# Patient Record
Sex: FEMALE | Race: WHITE | NOT HISPANIC OR LATINO | URBAN - METROPOLITAN AREA
[De-identification: names, ages, dates, MRNs, and addresses within clinical notes are randomized per-mention and may not be internally consistent; named-entity substitution may affect disease eponyms.]

---

## 2019-09-22 ENCOUNTER — EMERGENCY (EMERGENCY)
Facility: HOSPITAL | Age: 64
LOS: 1 days | Discharge: HOME | End: 2019-09-22
Attending: STUDENT IN AN ORGANIZED HEALTH CARE EDUCATION/TRAINING PROGRAM
Payer: MEDICARE

## 2019-09-22 VITALS
HEART RATE: 102 BPM | HEIGHT: 62 IN | DIASTOLIC BLOOD PRESSURE: 91 MMHG | SYSTOLIC BLOOD PRESSURE: 161 MMHG | OXYGEN SATURATION: 98 % | RESPIRATION RATE: 16 BRPM | WEIGHT: 171.96 LBS | TEMPERATURE: 98 F

## 2019-09-22 VITALS
HEART RATE: 78 BPM | TEMPERATURE: 99 F | RESPIRATION RATE: 16 BRPM | SYSTOLIC BLOOD PRESSURE: 162 MMHG | OXYGEN SATURATION: 98 % | DIASTOLIC BLOOD PRESSURE: 88 MMHG

## 2019-09-22 LAB
ALBUMIN SERPL ELPH-MCNC: 4.3 G/DL — SIGNIFICANT CHANGE UP (ref 3.3–5)
ALP SERPL-CCNC: 57 U/L — SIGNIFICANT CHANGE UP (ref 40–120)
ALT FLD-CCNC: 13 U/L — SIGNIFICANT CHANGE UP (ref 10–45)
ANION GAP SERPL CALC-SCNC: 14 MMOL/L — SIGNIFICANT CHANGE UP (ref 5–17)
AST SERPL-CCNC: 16 U/L — SIGNIFICANT CHANGE UP (ref 10–40)
BASOPHILS # BLD AUTO: 0 K/UL — SIGNIFICANT CHANGE UP (ref 0–0.2)
BASOPHILS NFR BLD AUTO: 0.3 % — SIGNIFICANT CHANGE UP (ref 0–2)
BILIRUB SERPL-MCNC: 0.8 MG/DL — SIGNIFICANT CHANGE UP (ref 0.2–1.2)
BUN SERPL-MCNC: 12 MG/DL — SIGNIFICANT CHANGE UP (ref 7–23)
CALCIUM SERPL-MCNC: 9.6 MG/DL — SIGNIFICANT CHANGE UP (ref 8.4–10.5)
CHLORIDE SERPL-SCNC: 104 MMOL/L — SIGNIFICANT CHANGE UP (ref 96–108)
CO2 SERPL-SCNC: 23 MMOL/L — SIGNIFICANT CHANGE UP (ref 22–31)
CREAT SERPL-MCNC: 0.74 MG/DL — SIGNIFICANT CHANGE UP (ref 0.5–1.3)
EOSINOPHIL # BLD AUTO: 0.3 K/UL — SIGNIFICANT CHANGE UP (ref 0–0.5)
EOSINOPHIL NFR BLD AUTO: 3.2 % — SIGNIFICANT CHANGE UP (ref 0–6)
GLUCOSE SERPL-MCNC: 149 MG/DL — HIGH (ref 70–99)
HCT VFR BLD CALC: 43.9 % — SIGNIFICANT CHANGE UP (ref 34.5–45)
HGB BLD-MCNC: 13.9 G/DL — SIGNIFICANT CHANGE UP (ref 11.5–15.5)
LYMPHOCYTES # BLD AUTO: 3.2 K/UL — SIGNIFICANT CHANGE UP (ref 1–3.3)
LYMPHOCYTES # BLD AUTO: 30.6 % — SIGNIFICANT CHANGE UP (ref 13–44)
MCHC RBC-ENTMCNC: 28.2 PG — SIGNIFICANT CHANGE UP (ref 27–34)
MCHC RBC-ENTMCNC: 31.6 GM/DL — LOW (ref 32–36)
MCV RBC AUTO: 89 FL — SIGNIFICANT CHANGE UP (ref 80–100)
MONOCYTES # BLD AUTO: 0.6 K/UL — SIGNIFICANT CHANGE UP (ref 0–0.9)
MONOCYTES NFR BLD AUTO: 5.4 % — SIGNIFICANT CHANGE UP (ref 2–14)
NEUTROPHILS # BLD AUTO: 6.4 K/UL — SIGNIFICANT CHANGE UP (ref 1.8–7.4)
NEUTROPHILS NFR BLD AUTO: 60.4 % — SIGNIFICANT CHANGE UP (ref 43–77)
PLATELET # BLD AUTO: 234 K/UL — SIGNIFICANT CHANGE UP (ref 150–400)
POTASSIUM SERPL-MCNC: 3.8 MMOL/L — SIGNIFICANT CHANGE UP (ref 3.5–5.3)
POTASSIUM SERPL-SCNC: 3.8 MMOL/L — SIGNIFICANT CHANGE UP (ref 3.5–5.3)
PROT SERPL-MCNC: 6.6 G/DL — SIGNIFICANT CHANGE UP (ref 6–8.3)
RBC # BLD: 4.93 M/UL — SIGNIFICANT CHANGE UP (ref 3.8–5.2)
RBC # FLD: 12.7 % — SIGNIFICANT CHANGE UP (ref 10.3–14.5)
SODIUM SERPL-SCNC: 141 MMOL/L — SIGNIFICANT CHANGE UP (ref 135–145)
WBC # BLD: 10.5 K/UL — SIGNIFICANT CHANGE UP (ref 3.8–10.5)
WBC # FLD AUTO: 10.5 K/UL — SIGNIFICANT CHANGE UP (ref 3.8–10.5)

## 2019-09-22 PROCEDURE — 99284 EMERGENCY DEPT VISIT MOD MDM: CPT | Mod: GC

## 2019-09-22 PROCEDURE — 99284 EMERGENCY DEPT VISIT MOD MDM: CPT

## 2019-09-22 PROCEDURE — 85027 COMPLETE CBC AUTOMATED: CPT

## 2019-09-22 PROCEDURE — 80053 COMPREHEN METABOLIC PANEL: CPT

## 2019-09-22 RX ORDER — OMEPRAZOLE 10 MG/1
1 CAPSULE, DELAYED RELEASE ORAL
Qty: 60 | Refills: 0
Start: 2019-09-22 | End: 2019-11-20

## 2019-09-22 NOTE — ED ADULT NURSE NOTE - NSIMPLEMENTINTERV_GEN_ALL_ED
Implemented All Universal Safety Interventions:  [place] to call system. Call bell, personal items and telephone within reach. Instruct patient to call for assistance. Room bathroom lighting operational. Non-slip footwear when patient is off stretcher. Physically safe environment: no spills, clutter or unnecessary equipment. Stretcher in lowest position, wheels locked, appropriate side rails in place.

## 2019-09-22 NOTE — ED PROVIDER NOTE - PATIENT PORTAL LINK FT
You can access the FollowMyHealth Patient Portal offered by [facility] by registering at the following website: [email or web address]. By joining [employer]’s FollowMyHealth portal, you will also be able to view your health information using other applications (apps) compatible with our system.

## 2019-09-22 NOTE — ED PROVIDER NOTE - CARE PROVIDER_API CALL
Karen Hdz (MD)  Dermatology; Dermatopathology  [address] [place] [address]  Phone: [phone]  Fax: [phone]  Follow Up Time: 7-10 Days

## 2019-09-22 NOTE — ED PROVIDER NOTE - ATTENDING CONTRIBUTION TO CARE
I performed a history and physical exam of the patient and discussed their management with the resident.  I reviewed the resident's note and agree with the documented findings and plan of care except as noted below. My medical decision making and observations are as follows:    64 F with no significant PMH who presents with rash x 2 weeks. Rash was originally pruritic, non-confluent non tender BLANCHING erythematous maculopapular rash that first started on bilateral forearms, palms, buttocks, proximal LEs. Large blister to right upper arm at start of symptoms that ruptured and caused cellulitic appearing area to upper arm.  No rupture blister as well as several tense blisters on indurated confluent erythematous base on upper arm. NEGATIVE NIKOLSKY SIGN. patient otherwiase without fevers or other systemic sytmpoms.  heart rrr, lungs cta, abd soft ntnd.  Possible initial contact dermatitis as well as new occurrence of pemphigoid disease - will check basic labs, contact dermatology and possible steroids

## 2019-09-22 NOTE — CHART NOTE - NSCHARTNOTEFT_GEN_A_CORE
Dermatology Resident Brief Note    65 y/o F no PMH with itchy rash x 1-2 weeks on b/l arms. Patient prescribed mometasone cream with some improvement however. Patient also prescribed 6-day prednisone taper however didn't take it. Patient noted worsening redness 1-2 days ago and was started on cefuroxime for concern for cellulitis, patient s/p 2 doses. Patient went to ED 2/2 concern with new bullae on right upper arm. Patient denies exposures to poison ivy/oak. No WBC. No fever.    PE: erythematous papules and plaques on b/l arms. pink patchy with crusted erosion and multiple tense bullae on right arm.     A/P: Allergic contact dermatitis 2/2 poison ivy. Discussed course and treatment with patient. Recommended steroid taper. Patient declined and thus recommended clobetasol and close follow up.   -recommend prednisone 60 mg x 5 days, 40 x 5 days, 20 x 5 days and 10 mg x 5 days and then stop  -can start Vit D/calcium and ranitidine during three week course  -apply clobetasol 0.05% ointment twice daily to affected area  -return precautions reviewed for signs of infection.     Patient can follow up with dermatology at [address] [place] [address], [facility]. Patient should call [phone] to schedule appointment with Dr. Marquez/Andreina on Tuesday mornings in 2 weeks.     Case discussed with Dr. Stanley    Pt seen and examined.      Judi Marquez MD   PGY-2, Dermatology.

## 2019-09-22 NOTE — ED PROVIDER NOTE - NS ED ROS FT
REVIEW OF SYSTEMS:    CONSTITUTIONAL: No weakness, fevers or chills  EYES/ENT: No visual changes;  No vertigo or throat pain   NECK: No pain or stiffness  RESPIRATORY: No cough, wheezing, hemoptysis; No shortness of breath  CARDIOVASCULAR: No chest pain or palpitations  GASTROINTESTINAL: No abdominal pain; nausea, vomiting;  diarrhea or constipation. No hemetemesis, melena or hematochezia.  GENITOURINARY: No dysuria, frequency or hematuria  NEUROLOGICAL: No numbness or weakness  SKIN: + rash, see HPI  All other review of systems is negative unless indicated above.

## 2019-09-22 NOTE — ED ADULT NURSE NOTE - OBJECTIVE STATEMENT
64F with no PMH comes to ED c/o rash x1 week. States she noticed red itchy rash to ventral portion of forearms. States they were itchy at times but never painful. Went to PMD and was told she has poison ivy. Was prescribed topical cortical steroids with mild relief. She noticed blistering to right upper extremity at bicep area. Large area of crusting noticed with blisters above it. Redness on forearm is blanchable. Denies fever/chills/travel/ joint pain/sick contacts. No one else has rash. In no distress. Will continue to monitor.

## 2019-09-22 NOTE — ED PROVIDER NOTE - PHYSICAL EXAMINATION
PHYSICAL EXAM:    General: No acute distress.  HEENT: NCAT.  PERRL.  EOMI. No oral blister or bleeding  Neck: Supple.  Full ROM.  No JVD.  No thyromegaly. No lymphadenopathy.   Heart: RRR.  Normal S1 and S2.  No murmurs, rubs, or gallops.   Lungs: CTAB. No wheezes, crackles, or rhonchi.    Abdomen: BS+, soft, NT/ND.  No organomegaly.  Extremities: No edema, clubbing, or cyanosis.  2+ peripheral pulses b/l.  Musculoskeletal: No deformities.  No spinal or paraspinal tenderness.  Neuro: A&Ox3.  CN II-XII intact.  5/5 strength in UE and LE b/l.  Tactile sensation intact in UE and LE b/l.  Cerebellar function intact    SKIN: bilateral non-confluent non tender BLANCHING erythematous maculopapular rash that first started on bilateral forearms, palms, buttocks, proximal LEs. Large rupture blister as well as several tense blisters on indurated confluent erythematous base in distal upper arm. NEGATIVE NIKOLSKY SIGN. No plantar rash.

## 2019-09-22 NOTE — ED PROVIDER NOTE - OBJECTIVE STATEMENT
64 F with no significant PMH who presents with rash x 2 weeks. Patient endorses bilateral non-confluent erythematous maculopapular rash that first started on bilateral forearms. Initially the rash was highly pruritic but not painful or weeping. She aslo developed large blister on the right proximal medial portion. She sough care with her PMD who prescribed mometasone topical cream for suspected poison ivy which alleviated the itching. Shortly after the large blister ruptured, which then caused adjacent redness and induration in proximal RUE. In addition, the patchy maculopapular rash spread to the palms of the hands well as gradually  to her buttocks. Her PMD prescribed cefuroxime which she took first dose yesterday. Afterwards, several new blisters occurred overlying the indurated erythematous area near where prior blister had ruptured. She did note the overall area of redness was receding. She denies recent travel, current oral ulcer. History of chicken pox, but not singles vaccine. No other medications. Of note, she had dental work in 03/2019 after which she developed some oral blisters which had sloughing and was told to monitor for worsening. 64 F with no significant PMH who presents with rash x 2 weeks. Patient endorses bilateral non-confluent erythematous maculopapular rash that first started on bilateral forearms. Initially the rash was highly pruritic but not painful or weeping. She aslo developed large blister on the right proximal arm. She sought care with her PMD who prescribed mometasone topical cream for suspected poison ivy which alleviated the itching. Shortly after the large blister ruptured, which then caused adjacent redness and induration in proximal RUE. In addition, the patchy maculopapular rash spread to the palms of the hands well as gradually  to her buttocks. Her PMD prescribed cefuroxime which she took first dose yesterday. Afterwards, several new blisters occurred overlying the indurated erythematous area near where prior blister had ruptured. She did note the overall area of redness was receding. She denies recent travel, current oral ulcer. History of chicken pox, but not singles vaccine. No other medications. Of note, she had dental work in 03/2019 after which she developed some oral blisters which had sloughing and was told to monitor for worsening.

## 2019-09-22 NOTE — ED PROVIDER NOTE - CARE PLAN
Principal Discharge DX:	Contact dermatitis  Assessment and plan of treatment:	Likely severe contact dermatitis in setting of posion ivy exposure, seen by derm.

## 2019-09-22 NOTE — ED ADULT NURSE NOTE - CHPI ED NUR SYMPTOMS NEG
no confusion/no petechia/no fever/no decreased eating/drinking/no vomiting/no body aches/no scaly patches on skin

## 2019-09-22 NOTE — ED PROVIDER NOTE - CLINICAL SUMMARY MEDICAL DECISION MAKING FREE TEXT BOX
64 F with no significant PMH who presents with rash x 2 weeks. The rash  is present bilateral non-confluent non tender BLANCHING erythematous maculopapular rash that first started on bilateral forearms, palms, buttocks, proximal LEs. Large rupture blister as well as several tense blisters on indurated confluent erythematous base in distal upper arm. NEGATIVE NIKOLSKY SIGN. Possible initial contact dermatitis as well as new occurrence of pemphigoid disease. 64 F with no significant PMH who presents with rash x 2 weeks. The rash  is present bilateral non-confluent non tender BLANCHING erythematous maculopapular rash that first started on bilateral forearms, palms, buttocks, proximal LEs. Large rupture blister as well as several tense blisters on indurated confluent erythematous base in distal upper arm. NEGATIVE NIKOLSKY SIGN. Possible initial contact dermatitis as well as new occurrence of pemphigoid disease.    Seen by derm, consistent with contact dermatitis

## 2023-05-29 ENCOUNTER — EMERGENCY (EMERGENCY)
Facility: HOSPITAL | Age: 68
LOS: 1 days | Discharge: HOME | End: 2023-05-29
Attending: EMERGENCY MEDICINE
Payer: MEDICARE

## 2023-05-29 VITALS
TEMPERATURE: 98 F | OXYGEN SATURATION: 97 % | WEIGHT: 179.02 LBS | HEART RATE: 113 BPM | HEIGHT: 64 IN | RESPIRATION RATE: 18 BRPM | SYSTOLIC BLOOD PRESSURE: 170 MMHG | DIASTOLIC BLOOD PRESSURE: 91 MMHG

## 2023-05-29 VITALS
OXYGEN SATURATION: 96 % | RESPIRATION RATE: 18 BRPM | SYSTOLIC BLOOD PRESSURE: 166 MMHG | HEART RATE: 91 BPM | DIASTOLIC BLOOD PRESSURE: 87 MMHG | TEMPERATURE: 98 F

## 2023-05-29 PROBLEM — Z78.9 OTHER SPECIFIED HEALTH STATUS: Chronic | Status: ACTIVE | Noted: 2019-09-22

## 2023-05-29 PROCEDURE — 99283 EMERGENCY DEPT VISIT LOW MDM: CPT

## 2023-05-29 PROCEDURE — 99284 EMERGENCY DEPT VISIT MOD MDM: CPT | Mod: FS

## 2023-05-29 RX ORDER — CHLORHEXIDINE GLUCONATE 213 G/1000ML
15 SOLUTION TOPICAL
Qty: 150 | Refills: 0
Start: 2023-05-29 | End: 2023-06-02

## 2023-05-29 RX ORDER — VALACYCLOVIR 500 MG/1
1 TABLET, FILM COATED ORAL
Qty: 20 | Refills: 0
Start: 2023-05-29 | End: 2023-06-04

## 2023-05-29 RX ORDER — VALACYCLOVIR 500 MG/1
1000 TABLET, FILM COATED ORAL ONCE
Refills: 0 | Status: COMPLETED | OUTPATIENT
Start: 2023-05-29 | End: 2023-05-29

## 2023-05-29 RX ADMIN — VALACYCLOVIR 1000 MILLIGRAM(S): 500 TABLET, FILM COATED ORAL at 09:36

## 2023-05-29 RX ADMIN — Medication 60 MILLIGRAM(S): at 09:36

## 2023-05-29 RX ADMIN — Medication 1 TABLET(S): at 09:39

## 2023-05-29 NOTE — ED PROVIDER NOTE - NSFOLLOWUPCLINICS_GEN_ALL_ED_FT
[facility] General Internal Medicine  General Internal Medicine  [address] [id number]  Phone: [phone]  Fax:   Follow Up Time: 4-6 Days

## 2023-05-29 NOTE — ED PROVIDER NOTE - NSFOLLOWUPINSTRUCTIONS_ED_ALL_ED_FT
You were seen in the Emergency Department and diagnosed with a shingles infection.  This infection is contagious, please avoid direct contact with infants/children, elderly, people that are pregnant or anyone with a compromised immune system.    Please call your dentist tomorrow morning to discuss your appointment as you have an active shingles infection and may need to reschedule.    Please follow-up with your primary doctor within the next 2-3 days for reevaluation.  If you do not have a primary doctor you may follow-up with our general internal medicine clinic.  Please call to schedule appointment.    Take Valtrex, Augmentin and prednisone as prescribed.    Use Peridex mouthwash as directed.    Return to the emergency department immediately if you develop any new/worsening symptoms including but not limited to worsening/spreading rash, loss of hearing, changes in speech/vision, fever, chest pain, shortness of breath or any other concerning symptoms. You were seen in the Emergency Department and diagnosed with a shingles infection.  This infection is contagious, please avoid direct contact with infants/children, elderly, people that are pregnant or anyone with a compromised immune system.    Please call your dentist tomorrow morning to discuss your appointment as you have an active shingles infection and may need to reschedule.    Please follow-up with your primary doctor within the next 2-3 days for reevaluation.  If you do not have a primary doctor you may follow-up with our general internal medicine clinic.  Please call to schedule appointment.    Take Valtrex, Augmentin and prednisone as prescribed.    Use Peridex mouthwash as directed.    Take Tylenol 650 mg every 6 hours as needed for pain. Take Motrin 600 mg every 8 hours with food for additional relief.     Return to the emergency department immediately if you develop any new/worsening symptoms including but not limited to worsening/spreading rash, loss of hearing, changes in speech/vision, fever, chest pain, shortness of breath or any other concerning symptoms.

## 2023-05-29 NOTE — ED ADULT NURSE NOTE - OBJECTIVE STATEMENT
Pt is a 67 year old female presenting to ED with 1 week of "rash" on left side of face and lips.  Vesicles noted, weeping and crusting.

## 2023-05-29 NOTE — ED ADULT NURSE NOTE - NSFALLUNIVINTERV_ED_ALL_ED
Bed/Stretcher in lowest position, wheels locked, appropriate side rails in place/Call bell, personal items and telephone in reach/Instruct patient to call for assistance before getting out of bed/chair/stretcher/Non-slip footwear applied when patient is off stretcher/[place] to call system/Physically safe environment - no spills, clutter or unnecessary equipment/Purposeful proactive rounding/Room/bathroom lighting operational, light cord in reach

## 2023-05-29 NOTE — ED ADULT TRIAGE NOTE - CHIEF COMPLAINT QUOTE
Left side of face rash, started with sore on lip, now multiple sores on only left side of face.  Also has a toothache on left side of mouth.

## 2023-05-29 NOTE — ED PROVIDER NOTE - CLINICAL SUMMARY MEDICAL DECISION MAKING FREE TEXT BOX
Bailey: 67 year old female no pmhx here with 1 week of left sided dental pain and 1 day left sided facial rash.  2 days ago noted "cold sore" to left lower lip.  no painful/itchy to left side. will give pain control, antiviral. steroids, reassess

## 2023-05-29 NOTE — ED PROVIDER NOTE - OBJECTIVE STATEMENT
68 yo female no reported pmhx presents to the ED c/o 1 week L sided dental pain and 1 day of L side facial rash. Has been having aching pain to L side teeth, scheduled dentist appt for tomorrow to be seen. 2 days ago noted cold sore to left lower lip, today woke up with rash to L side of face. Rash is not painful or itchy, still endorsing pain to teeth on left side. Had chickenpox as child, no hx shingles. Denies fever/chills, tinnitus/hearing changes, speech/visual changes, headache, body aches, neck pain/stiffness, hx diabetes, n/v/d, cp, sob.

## 2023-05-29 NOTE — ED PROVIDER NOTE - PATIENT PORTAL LINK FT
You can access the FollowMyHealth Patient Portal offered by [facility] by registering at the following website: [email or web address]. By joining [employer]’s FollowMyHealth portal, you will also be able to view your health information using other applications (apps) compatible with our system.

## 2023-05-29 NOTE — ED PROVIDER NOTE - PHYSICAL EXAMINATION
Gen: Well appearing, AAO x 3, NAD  Skin: +vesicular rash to V2/3 dermatome of L side face, does not cross midline. No lesions to tip of nose, eyes, TMs or EACs b/l.   HEENT: NC/AT, PERRLA, EOMI. +multiple dental caries and gingival erythema/maceration to left upper and lower tooth margins. No fluctuance or induration notes to gum line.  Resp: unlabored CTAB  Cardiac: rrr s1s2, no murmurs, rubs or gallops  GI: ND, +BS, Soft, NT  Ext: no pedal edema, FROM in all extremities  Neuro: CN II-XII intact, no focal deficits.

## 2023-12-01 NOTE — ED ADULT NURSE NOTE - NS TRANSFER PATIENT BELONGINGS
Left femoral neck fracture Left femoral neck fracture Left femoral neck fracture Left femoral neck fracture Left femoral neck fracture Left femoral neck fracture Clothing

## 2024-02-29 NOTE — ED ADULT NURSE NOTE - TEMPLATE
General Spoke with patient- she stated that she had a virtual appointment with Urgent Care and they are going to prescribe the doxycycline. She stated that she is going to keep us posted on how it works for her.    Thank you!